# Patient Record
Sex: MALE | Race: WHITE | NOT HISPANIC OR LATINO | URBAN - NONMETROPOLITAN AREA
[De-identification: names, ages, dates, MRNs, and addresses within clinical notes are randomized per-mention and may not be internally consistent; named-entity substitution may affect disease eponyms.]

---

## 2023-06-12 ENCOUNTER — TELEMEDICINE (OUTPATIENT)
Dept: PRIMARY CARE | Facility: CLINIC | Age: 63
End: 2023-06-12
Payer: COMMERCIAL

## 2023-06-12 DIAGNOSIS — L08.9 SKIN INFECTION: Primary | ICD-10-CM

## 2023-06-12 PROCEDURE — 99213 OFFICE O/P EST LOW 20 MIN: CPT | Performed by: FAMILY MEDICINE

## 2023-06-12 RX ORDER — DOXYCYCLINE 100 MG/1
100 CAPSULE ORAL 2 TIMES DAILY
Qty: 14 CAPSULE | Refills: 0 | Status: SHIPPED | OUTPATIENT
Start: 2023-06-12 | End: 2023-06-19

## 2023-06-12 RX ORDER — SULFAMETHOXAZOLE AND TRIMETHOPRIM 800; 160 MG/1; MG/1
1 TABLET ORAL 2 TIMES DAILY
Qty: 14 TABLET | Refills: 0 | Status: SHIPPED | OUTPATIENT
Start: 2023-06-12 | End: 2023-06-19

## 2023-06-17 NOTE — PROGRESS NOTES
Subjective     Magno Marie is a 62 y.o. male who presents for No chief complaint on file..       This was completed via telephone due to the restrictions of the COVID-19 pandemic.  All issues as below were discussed and addressed but no physical exam was performed.  If it was felt that the patient should be evaluated in clinic then they were director there.  The patient/parent verbally consented to the visit.  Virtual visit Doxy.  Me video call    HPI    Complaining red tender area on the lower back causing pain and discomfort and redness.  Does not wish to go to urgent care or emergency room.  Denies abscess.                Review of Systems  Dictated as above  Objective   Virtual visit    Physical Exam  Virtual visit  Assessment/Plan     Skin infection.  Advised on warm compresses.  Started on medication.  Explained adverse effects of medications.  Advised to call 911 or to go to the emergency room as soon as possible if the redness is growing, pain and pain is getting worse, fever, chills, nausea and vomiting.  He understood verbalized understood and agreed              Problem List Items Addressed This Visit    None  Visit Diagnoses       Skin infection    -  Primary    Relevant Medications    sulfamethoxazole-trimethoprim (Bactrim DS) 800-160 mg tablet    doxycycline (Vibramycin) 100 mg capsule

## 2023-06-19 ENCOUNTER — TELEPHONE (OUTPATIENT)
Dept: PRIMARY CARE | Facility: CLINIC | Age: 63
End: 2023-06-19
Payer: COMMERCIAL

## 2023-06-19 NOTE — TELEPHONE ENCOUNTER
Received call from the patient and skin infection/abscess not improving with oral antibiotics.Advised him to go to [facility] ASAP.Patient went to the [facility] .

## 2023-06-27 ENCOUNTER — HOSPITAL ENCOUNTER (OUTPATIENT)
Dept: DATA CONVERSION | Facility: HOSPITAL | Age: 63
End: 2023-06-27
Attending: SURGERY | Admitting: SURGERY
Payer: COMMERCIAL

## 2023-06-27 DIAGNOSIS — I10 ESSENTIAL (PRIMARY) HYPERTENSION: ICD-10-CM

## 2023-06-27 DIAGNOSIS — S31.000A UNSPECIFIED OPEN WOUND OF LOWER BACK AND PELVIS WITHOUT PENETRATION INTO RETROPERITONEUM, INITIAL ENCOUNTER: ICD-10-CM

## 2023-06-27 DIAGNOSIS — E78.5 HYPERLIPIDEMIA, UNSPECIFIED: ICD-10-CM

## 2023-06-27 DIAGNOSIS — Z87.891 PERSONAL HISTORY OF NICOTINE DEPENDENCE: ICD-10-CM

## 2023-06-27 DIAGNOSIS — D46.9 MYELODYSPLASTIC SYNDROME, UNSPECIFIED (MULTI): ICD-10-CM

## 2023-06-27 DIAGNOSIS — K21.9 GASTRO-ESOPHAGEAL REFLUX DISEASE WITHOUT ESOPHAGITIS: ICD-10-CM

## 2023-06-27 DIAGNOSIS — L02.212 CUTANEOUS ABSCESS OF BACK (ANY PART, EXCEPT BUTTOCK): ICD-10-CM

## 2023-06-29 LAB
GRAM STAIN: NORMAL
TISSUE/WOUND CULTURE/SMEAR: NORMAL

## 2023-09-12 ENCOUNTER — OFFICE VISIT (OUTPATIENT)
Dept: PRIMARY CARE | Facility: CLINIC | Age: 63
End: 2023-09-12
Payer: COMMERCIAL

## 2023-09-12 VITALS
HEIGHT: 71 IN | OXYGEN SATURATION: 98 % | WEIGHT: 188.8 LBS | RESPIRATION RATE: 18 BRPM | HEART RATE: 60 BPM | BODY MASS INDEX: 26.43 KG/M2 | SYSTOLIC BLOOD PRESSURE: 135 MMHG | DIASTOLIC BLOOD PRESSURE: 86 MMHG

## 2023-09-12 DIAGNOSIS — Z00.00 HEALTHCARE MAINTENANCE: ICD-10-CM

## 2023-09-12 DIAGNOSIS — E78.5 DYSLIPIDEMIA: ICD-10-CM

## 2023-09-12 DIAGNOSIS — Z12.5 SCREENING PSA (PROSTATE SPECIFIC ANTIGEN): ICD-10-CM

## 2023-09-12 DIAGNOSIS — I10 BENIGN ESSENTIAL HTN: ICD-10-CM

## 2023-09-12 DIAGNOSIS — D46.9 MYELODYSPLASTIC SYNDROME (MULTI): ICD-10-CM

## 2023-09-12 DIAGNOSIS — Z13.220 LIPID SCREENING: ICD-10-CM

## 2023-09-12 PROBLEM — H35.30 MACULAR DEGENERATION: Status: ACTIVE | Noted: 2023-09-12

## 2023-09-12 PROBLEM — D69.6 THROMBOCYTOPENIA (CMS-HCC): Status: RESOLVED | Noted: 2018-06-27 | Resolved: 2023-09-12

## 2023-09-12 PROBLEM — R07.9 CHEST PAIN: Status: RESOLVED | Noted: 2023-09-12 | Resolved: 2023-09-12

## 2023-09-12 PROBLEM — R74.8 ABNORMAL LIVER ENZYMES: Status: ACTIVE | Noted: 2018-06-27

## 2023-09-12 PROBLEM — B36.9 FUNGAL INFECTION OF SKIN: Status: RESOLVED | Noted: 2023-09-12 | Resolved: 2023-09-12

## 2023-09-12 PROBLEM — M25.561 KNEE PAIN, RIGHT: Status: RESOLVED | Noted: 2023-09-12 | Resolved: 2023-09-12

## 2023-09-12 PROBLEM — H35.3132 NONEXUDATIVE AGE-RELATED MACULAR DEGENERATION, BILATERAL, INTERMEDIATE DRY STAGE: Status: ACTIVE | Noted: 2018-04-25

## 2023-09-12 PROBLEM — R41.3 MEMORY PROBLEM: Status: ACTIVE | Noted: 2023-09-12

## 2023-09-12 PROBLEM — R91.8 LUNG INFILTRATE: Status: RESOLVED | Noted: 2023-09-12 | Resolved: 2023-09-12

## 2023-09-12 PROBLEM — L72.3 SEBACEOUS CYST: Status: RESOLVED | Noted: 2023-09-12 | Resolved: 2023-09-12

## 2023-09-12 PROBLEM — L02.212 BACK ABSCESS: Status: RESOLVED | Noted: 2023-09-12 | Resolved: 2023-09-12

## 2023-09-12 PROBLEM — R03.0 ELEVATED BP WITHOUT DIAGNOSIS OF HYPERTENSION: Status: ACTIVE | Noted: 2017-08-25

## 2023-09-12 PROBLEM — H25.13 CATARACT, NUCLEAR SCLEROTIC SENILE, BILATERAL: Status: ACTIVE | Noted: 2018-04-25

## 2023-09-12 PROBLEM — S21.209A OPEN WOUND OF BACK: Status: RESOLVED | Noted: 2023-09-12 | Resolved: 2023-09-12

## 2023-09-12 PROBLEM — R19.4 ALTERED BOWEL HABITS: Status: ACTIVE | Noted: 2023-09-12

## 2023-09-12 PROBLEM — R89.9 ABNORMAL LABORATORY TEST: Status: ACTIVE | Noted: 2019-02-21

## 2023-09-12 PROBLEM — E66.3 OVER WEIGHT: Status: RESOLVED | Noted: 2017-05-11 | Resolved: 2023-09-12

## 2023-09-12 PROBLEM — U07.1 COVID-19 VIRUS INFECTION: Status: RESOLVED | Noted: 2023-09-12 | Resolved: 2023-09-12

## 2023-09-12 PROBLEM — H93.19 TINNITUS: Status: ACTIVE | Noted: 2023-09-12

## 2023-09-12 PROCEDURE — 3075F SYST BP GE 130 - 139MM HG: CPT | Performed by: INTERNAL MEDICINE

## 2023-09-12 PROCEDURE — 99204 OFFICE O/P NEW MOD 45 MIN: CPT | Performed by: INTERNAL MEDICINE

## 2023-09-12 PROCEDURE — 3079F DIAST BP 80-89 MM HG: CPT | Performed by: INTERNAL MEDICINE

## 2023-09-12 PROCEDURE — 1036F TOBACCO NON-USER: CPT | Performed by: INTERNAL MEDICINE

## 2023-09-12 RX ORDER — ACETAMINOPHEN 500 MG
2000 TABLET ORAL
COMMUNITY

## 2023-09-12 RX ORDER — ACETAMINOPHEN 160 MG/5ML
200 SUSPENSION, ORAL (FINAL DOSE FORM) ORAL
COMMUNITY

## 2023-09-12 RX ORDER — ROSUVASTATIN CALCIUM 20 MG/1
20 TABLET, COATED ORAL DAILY
Qty: 90 TABLET | Refills: 1 | Status: SHIPPED | OUTPATIENT
Start: 2023-09-12 | End: 2024-02-12

## 2023-09-12 RX ORDER — TRIAMCINOLONE ACETONIDE 55 UG/1
2 SPRAY, METERED NASAL AS NEEDED
COMMUNITY

## 2023-09-12 RX ORDER — AMOXICILLIN 500 MG/1
500 CAPSULE ORAL AS NEEDED
COMMUNITY

## 2023-09-12 RX ORDER — LOSARTAN POTASSIUM 25 MG/1
25 TABLET ORAL DAILY
COMMUNITY
End: 2023-09-12 | Stop reason: SDUPTHER

## 2023-09-12 RX ORDER — AZITHROMYCIN 250 MG/1
TABLET, FILM COATED ORAL AS NEEDED
COMMUNITY
Start: 2022-09-23

## 2023-09-12 RX ORDER — ROSUVASTATIN CALCIUM 20 MG/1
20 TABLET, COATED ORAL DAILY
COMMUNITY
Start: 2022-12-30 | End: 2023-09-12 | Stop reason: SDUPTHER

## 2023-09-12 RX ORDER — LOSARTAN POTASSIUM 25 MG/1
25 TABLET ORAL DAILY
Qty: 90 TABLET | Refills: 1 | Status: SHIPPED | OUTPATIENT
Start: 2023-09-12 | End: 2024-02-12

## 2023-09-12 RX ORDER — FAMOTIDINE 20 MG/1
20 TABLET, FILM COATED ORAL
COMMUNITY

## 2023-09-12 ASSESSMENT — PAIN SCALES - GENERAL: PAINLEVEL: 0-NO PAIN

## 2023-09-12 ASSESSMENT — ENCOUNTER SYMPTOMS: SHORTNESS OF BREATH: 1

## 2023-09-12 ASSESSMENT — PATIENT HEALTH QUESTIONNAIRE - PHQ9
1. LITTLE INTEREST OR PLEASURE IN DOING THINGS: NOT AT ALL
2. FEELING DOWN, DEPRESSED OR HOPELESS: NOT AT ALL
SUM OF ALL RESPONSES TO PHQ9 QUESTIONS 1 AND 2: 0

## 2023-09-12 NOTE — PROGRESS NOTES
Patient ID:   Magno Marie is a 63 y.o. male with PMH remarkable for HTN, GERD, myelodysplastic syndrome (followed by Dr Bernard, [facility]), macular degeneration, vitamin D deficiency who presents to the office today for Establish Care.    HEALTH MAINTENANCE: Establish Care  Previous PCP: Dr Vikas MD  Smoking: Former Smoker, quit in   Labs: 2023  PSA: 1.49 on 2022  Colonoscopy (45-75): 2022 with Dr Sol  Lung cancer screening (55-80 + 30 pack year + smoking/quit in last 15 years): 2022  Echo 2022 showed LVSF 55-60%, impaired relaxation  - had sebaceous cyst lanced on 2023- took atb for this  - on neutropenic precautions  - advised that he should wear a mask when out in public    SOCIAL HISTORY:  Social History     Tobacco Use    Smoking status: Former     Packs/day: 1.00     Years: 5.00     Additional pack years: 0.00     Total pack years: 5.00     Types: Cigarettes     Start date: 1977     Quit date: 1982     Years since quittin.7     Passive exposure: Never    Smokeless tobacco: Never   Substance Use Topics    Alcohol use: Not Currently     Comment: Once a month or less    Drug use: Never     IMMUNIZATIONS:  Immunization History   Administered Date(s) Administered    DTaP vaccine, pediatric  (INFANRIX) 2017    Flu vaccine (IIV4), preservative free *Check age/dose* 2020, 2021    Hepatitis A vaccine, age 19 years and greater (HAVRIX) 2010, 12/10/2010    Hepatitis B vaccine, adult (RECOMBIVAX, ENGERIX) 2010, 2010, 12/10/2010    Influenza, injectable, MDCK, preservative free, quadrivalent 2022    Moderna COVID-19 vaccine, bivalent, blue cap/gray label *Check age/dose* 2022    Moderna SARS-CoV-2 Vaccination 2021, 2021, 2021    Td vaccine, age 7 years and older (TENIVAC) 2007    Zoster vaccine, recombinant, adult (SHINGRIX) 2018, 02/15/2019     REVIEW OF SYSTEMS:  Review of Systems    Respiratory:  Positive for shortness of breath.    All other systems reviewed and are negative.    ALLERGIES:  No Known Allergies     VITAL SIGNS:  Vitals:    09/12/23 1531   BP: 135/86   Pulse: 60   Resp: 18   SpO2: 98%       Physical Exam  Vitals reviewed.   Constitutional:       General: He is not in acute distress.     Appearance: Normal appearance. He is not ill-appearing.   HENT:      Head: Normocephalic and atraumatic.      Right Ear: Tympanic membrane and external ear normal.      Left Ear: Tympanic membrane and external ear normal.      Nose: Nose normal.      Mouth/Throat:      Mouth: Mucous membranes are moist.      Pharynx: Oropharynx is clear.   Eyes:      Conjunctiva/sclera: Conjunctivae normal.      Pupils: Pupils are equal, round, and reactive to light.   Cardiovascular:      Rate and Rhythm: Normal rate and regular rhythm.      Heart sounds: Normal heart sounds. No murmur heard.  Pulmonary:      Effort: Pulmonary effort is normal. No respiratory distress.      Breath sounds: Normal breath sounds. No wheezing.   Abdominal:      General: There is no distension.      Palpations: Abdomen is soft. There is no mass.      Tenderness: There is no abdominal tenderness.   Musculoskeletal:         General: Normal range of motion.      Cervical back: Normal range of motion and neck supple.   Skin:     General: Skin is warm and dry.      Comments: I&d area on back is healed   Neurological:      General: No focal deficit present.      Mental Status: He is alert and oriented to person, place, and time.      Sensory: No sensory deficit.      Motor: No weakness.      Coordination: Coordination normal.      Gait: Gait normal.   Psychiatric:         Mood and Affect: Mood normal.         Behavior: Behavior normal.       MEDICATIONS:  Current Outpatient Medications on File Prior to Visit   Medication Sig Dispense Refill    amoxicillin (Amoxil) 500 mg capsule Take 1 capsule (500 mg) by mouth if needed (prior to dental  procedure).      azithromycin (Zithromax) 250 mg tablet if needed.      cholecalciferol (Vitamin D3) 50 mcg (2,000 unit) capsule Take 2,000 Int'l Units by mouth once daily.      coenzyme Q-10 200 mg capsule Take 1 capsule (200 mg) by mouth once daily.      ELDERBERRY FRUIT ORAL Take 1 tablet by mouth once daily.      famotidine (Pepcid) 20 mg tablet Take 1 tablet (20 mg) by mouth once daily.      mv-min/FA/vit K/lutein/zeaxant (PRESERVISION AREDS 2 PLUS MV ORAL) Take by mouth.      NON FORMULARY Take by mouth once daily. Lipoflavonoid      triamcinolone (Nasacort) 55 mcg nasal inhaler Administer 2 sprays into affected nostril(s) if needed.      [DISCONTINUED] losartan (Cozaar) 25 mg tablet Take 1 tablet (25 mg) by mouth once daily.      [DISCONTINUED] rosuvastatin (Crestor) 20 mg tablet Take 1 tablet (20 mg) by mouth once daily.       No current facility-administered medications on file prior to visit.      LABORATORY DATA:  Lab Results   Component Value Date    WBC 1.3 (L) 06/19/2023    HGB 12.6 (L) 06/19/2023    HCT 40.6 (L) 06/19/2023     (L) 06/19/2023    CHOL 121 08/20/2022    TRIG 93 08/20/2022    HDL 45.0 08/20/2022    ALT 27 06/19/2023    AST 20 06/19/2023     06/19/2023    K 3.8 06/19/2023     06/19/2023    CREATININE 1.15 06/19/2023    BUN 18 06/19/2023    CO2 24 06/19/2023    TSH 2.34 08/20/2022    PSA 1.3 11/12/2021    INR 1.1 06/13/2022     ASSESSMENT AND PLAN:  Assessment/Plan   Diagnoses and all orders for this visit:  Healthcare maintenance  -     Lipid panel; Future  -     Tsh With Reflex To Free T4 If Abnormal; Future  -     Vitamin D 25-Hydroxy,Total (for eval of Vitamin D levels); Future  -     Vitamin B12; Future  Myelodysplastic syndrome (CMS/HCC)  -     Tsh With Reflex To Free T4 If Abnormal; Future  -     Vitamin D 25-Hydroxy,Total (for eval of Vitamin D levels); Future  -     Vitamin B12; Future  Benign essential HTN  -     losartan (Cozaar) 25 mg tablet; Take 1 tablet (25  mg) by mouth once daily.  Dyslipidemia  -     rosuvastatin (Crestor) 20 mg tablet; Take 1 tablet (20 mg) by mouth once daily.  Lipid screening  -     Lipid panel; Future  Screening PSA (prostate specific antigen)  -     Prostate Spec.Ag,Screen; Future      --------------------  Written by Eboni Caal RN, acting as a scribe for Dr. Casper. This note accurately reflects the work and decisions made by Dr. Casper.     I, Dr. Casper, attest all medical record entries made by the scribe were under my direction and were personally dictated by me. I have reviewed the chart and agree that the record accurately reflects my performance of the history, physical exam, and assessment and plan.

## 2023-09-29 VITALS — WEIGHT: 191.8 LBS | BODY MASS INDEX: 22.19 KG/M2 | HEIGHT: 78 IN

## 2023-09-30 NOTE — H&P
History & Physical Reviewed:   I have reviewed the History and Physical dated:  23-Jun-2023   History and Physical reviewed and relevant findings noted. Patient examined to review pertinent physical  findings.: No significant changes   Home Medications Reviewed: no changes noted   Allergies Reviewed: no changes noted       ERAS (Enhanced Recovery After Surgery):  ·  ERAS Patient: no     Consent:   COVID-19 Consent:  ·  COVID-19 Risk Consent Surgeon has reviewed key risks related to the risk of carlos COVID-19 and if they contract COVID-19 what the risks are.       Electronic Signatures:  Tito Sol)  (Signed 27-Jun-2023 10:52)   Authored: History & Physical Reviewed, ERAS, Consent,  Note Completion      Last Updated: 27-Jun-2023 10:52 by Tito Sol)

## 2023-10-02 NOTE — OP NOTE
Post Operative Note:     PreOp Diagnosis: Lower back abscess, open back wound,  myelodysplastic syndrome   Post-Procedure Diagnosis: Same   Procedure: 1.  Debridement of lower back abscess  measuring 3 x 3 x 1 cm  2.   3.   4.   5.   Surgeon: Sweta   Resident/Fellow/Other Assistant: Bhavik   Anesthesia: MAC   Estimated Blood Loss (mL): none   Specimen: yes. Cultures were sent   Complications: None   Findings: See note   Additional Details: Wound class IV  Wound packed with Exufiber silver Mepilex border dressing.     Operative Report Dictated:  Dictation: not applicable - note contains Operative  Report   Operative Report:    Indications-this is a 59-year-old male with myelodysplastic syndrome who presented with an open wound of the lower back.  This been incised in the emergency room  and he now presented for definitive treatment of this as this continues to have purulent drainage.  Initial cultures confirmed staph coccus.    Findings-the patient had a 3 x 3 x 1 cm wound of the lower back that had necrotic tissue present within this.  All the nonviable tissue was removed with the scissors and this was carried down to fresh bleeding tissue.  This was a sharp excisional debridement.   The depth of debridement measured 1 cm.      Procedure-after obtaining informed consent the patient discussing all the risk which include but not limited pain, infection, bleeding, cardiac, pulmonary, neurologic, locomotor, anesthetic events or laparoscopic including death the patient was placed  in the left lateral decubitus position.  The back was prepped and draped in aseptic fashion.  The wound was examined.  There was nonviable tissue present within the depth of the wound.  This was sharply removed with a scissors curettes were used to excise  all nonviable tissue and this was carried down to fresh bleeding tissue.  This was a sharp excisional debridement.  There was complete hemostasis of the wound with cautery.  Dressings were  placed as above.  Patient proceed on discharge to the recovery  in stable condition.      Electronic Signatures:  Tito Sol)  (Signed 27-Jun-2023 11:50)   Authored: Post Operative Note, Note Completion      Last Updated: 27-Jun-2023 11:50 by Tito Sol (MD)

## 2024-02-09 DIAGNOSIS — E78.5 DYSLIPIDEMIA: ICD-10-CM

## 2024-02-09 DIAGNOSIS — I10 BENIGN ESSENTIAL HTN: ICD-10-CM

## 2024-02-12 RX ORDER — ROSUVASTATIN CALCIUM 20 MG/1
20 TABLET, COATED ORAL DAILY
Qty: 90 TABLET | Refills: 1 | Status: SHIPPED | OUTPATIENT
Start: 2024-02-12

## 2024-02-12 RX ORDER — LOSARTAN POTASSIUM 25 MG/1
25 TABLET ORAL DAILY
Qty: 90 TABLET | Refills: 1 | Status: SHIPPED | OUTPATIENT
Start: 2024-02-12

## 2024-07-16 ENCOUNTER — PATIENT OUTREACH (OUTPATIENT)
Dept: PRIMARY CARE | Facility: CLINIC | Age: 64
End: 2024-07-16
Payer: COMMERCIAL

## 2024-07-16 RX ORDER — INDOMETHACIN 25 MG/1
25 CAPSULE ORAL
COMMUNITY

## 2024-07-16 NOTE — PROGRESS NOTES
Discharge Facility: [facility]  Discharge Diagnosis: Non-STEMI  Admission Date: 13 July 24  Discharge Date: 15 July 24    PCP Appointment Date: 29 July 24 (Set by me)  Specialist Appointment Date: 26 July 24 ([facility] Marco Antonio MCDOWELL @ [facility])  Hospital Encounter and Summary Linked: Yes    See discharge assessment below for further details     Engagement  Call Start Time: 1036 (7/16/2024 10:46 AM)    Medications  Medications reviewed with patient/caregiver?: Yes (7/16/2024 10:46 AM)  Is the patient having any side effects they believe may be caused by any medication additions or changes?: No (7/16/2024 10:46 AM)  Does the patient have all medications ordered at discharge?: Yes (7/16/2024 10:46 AM)  Prescription Comments: None (7/16/2024 10:46 AM)  Is the patient taking all medications as directed (includes completed medication regime)?: Yes (7/16/2024 10:46 AM)  Medication Comments: None (7/16/2024 10:46 AM)    Appointments  Does the patient have a primary care provider?: Yes (follow up set by me for 29 July 24) (7/16/2024 10:46 AM)  Care Management Interventions: Verified appointment date/time/provider (7/16/2024 10:46 AM)  Has the patient kept scheduled appointments due by today?: Yes (7/16/2024 10:46 AM)  Care Management Interventions: Advised patient to keep appointment (7/16/2024 10:46 AM)    Self Management  What is the home health agency?: N/A (7/16/2024 10:46 AM)  Has home health visited the patient within 72 hours of discharge?: Not applicable (7/16/2024 10:46 AM)  What Durable Medical Equipment (DME) was ordered?: N/A (7/16/2024 10:46 AM)    Patient Teaching  Does the patient have access to their discharge instructions?: Yes (7/16/2024 10:46 AM)  Care Management Interventions: Reviewed instructions with patient (7/16/2024 10:46 AM)  What is the patient's perception of their health status since discharge?: Improving (7/16/2024 10:46 AM)  Is the patient/caregiver able to teach back the hierarchy of who to  call/visit for symptoms/problems? PCP, Specialist, Home Health nurse, Urgent Care, ED, 911: Yes (7/16/2024 10:46 AM)  Patient/Caregiver Education Comments: None (7/16/2024 10:46 AM)    Wrap Up  Wrap Up Additional Comments: None (7/16/2024 10:46 AM)  Call End Time: 1046 (7/16/2024 10:46 AM)    Pt grateful for outreach call and is agreeable to being contacted after PCP appt to see how they are doing.

## 2024-07-29 ENCOUNTER — APPOINTMENT (OUTPATIENT)
Dept: PRIMARY CARE | Facility: CLINIC | Age: 64
End: 2024-07-29
Payer: MEDICARE

## 2024-07-29 VITALS
HEIGHT: 71 IN | RESPIRATION RATE: 16 BRPM | HEART RATE: 86 BPM | BODY MASS INDEX: 25.2 KG/M2 | SYSTOLIC BLOOD PRESSURE: 140 MMHG | OXYGEN SATURATION: 94 % | WEIGHT: 180 LBS | DIASTOLIC BLOOD PRESSURE: 86 MMHG

## 2024-07-29 DIAGNOSIS — K59.00 CONSTIPATION, UNSPECIFIED CONSTIPATION TYPE: ICD-10-CM

## 2024-07-29 DIAGNOSIS — I10 BENIGN ESSENTIAL HTN: ICD-10-CM

## 2024-07-29 DIAGNOSIS — Z09 HOSPITAL DISCHARGE FOLLOW-UP: ICD-10-CM

## 2024-07-29 DIAGNOSIS — I30.9 ACUTE PERICARDITIS, UNSPECIFIED TYPE (HHS-HCC): ICD-10-CM

## 2024-07-29 DIAGNOSIS — E78.5 DYSLIPIDEMIA: ICD-10-CM

## 2024-07-29 PROCEDURE — 99495 TRANSJ CARE MGMT MOD F2F 14D: CPT | Performed by: INTERNAL MEDICINE

## 2024-07-29 PROCEDURE — 1159F MED LIST DOCD IN RCRD: CPT | Performed by: INTERNAL MEDICINE

## 2024-07-29 PROCEDURE — 1158F ADVNC CARE PLAN TLK DOCD: CPT | Performed by: INTERNAL MEDICINE

## 2024-07-29 PROCEDURE — 1036F TOBACCO NON-USER: CPT | Performed by: INTERNAL MEDICINE

## 2024-07-29 PROCEDURE — 1125F AMNT PAIN NOTED PAIN PRSNT: CPT | Performed by: INTERNAL MEDICINE

## 2024-07-29 PROCEDURE — 1123F ACP DISCUSS/DSCN MKR DOCD: CPT | Performed by: INTERNAL MEDICINE

## 2024-07-29 PROCEDURE — 3079F DIAST BP 80-89 MM HG: CPT | Performed by: INTERNAL MEDICINE

## 2024-07-29 PROCEDURE — 3008F BODY MASS INDEX DOCD: CPT | Performed by: INTERNAL MEDICINE

## 2024-07-29 PROCEDURE — 1160F RVW MEDS BY RX/DR IN RCRD: CPT | Performed by: INTERNAL MEDICINE

## 2024-07-29 PROCEDURE — 3077F SYST BP >= 140 MM HG: CPT | Performed by: INTERNAL MEDICINE

## 2024-07-29 RX ORDER — SENNOSIDES 8.6 MG/1
1 TABLET ORAL DAILY
Qty: 90 TABLET | Refills: 2 | Status: SHIPPED | OUTPATIENT
Start: 2024-07-29 | End: 2025-04-25

## 2024-07-29 RX ORDER — LOSARTAN POTASSIUM 25 MG/1
25 TABLET ORAL DAILY
Qty: 90 TABLET | Refills: 1 | Status: SHIPPED | OUTPATIENT
Start: 2024-07-29

## 2024-07-29 RX ORDER — ROSUVASTATIN CALCIUM 20 MG/1
20 TABLET, COATED ORAL DAILY
Qty: 90 TABLET | Refills: 1 | Status: SHIPPED | OUTPATIENT
Start: 2024-07-29

## 2024-07-29 RX ORDER — LACTULOSE 10 G/15ML
20 SOLUTION ORAL; RECTAL DAILY
Qty: 473 ML | Refills: 3 | Status: SHIPPED | OUTPATIENT
Start: 2024-07-29

## 2024-07-29 RX ORDER — DOCUSATE SODIUM 100 MG/1
100 CAPSULE, LIQUID FILLED ORAL 2 TIMES DAILY
Qty: 60 CAPSULE | Refills: 3 | Status: SHIPPED | OUTPATIENT
Start: 2024-07-29

## 2024-07-29 ASSESSMENT — ENCOUNTER SYMPTOMS
CONSTITUTIONAL NEGATIVE: 1
CONSTIPATION: 1
PSYCHIATRIC NEGATIVE: 1
RESPIRATORY NEGATIVE: 1
MUSCULOSKELETAL NEGATIVE: 1
CARDIOVASCULAR NEGATIVE: 1
NEUROLOGICAL NEGATIVE: 1

## 2024-07-29 ASSESSMENT — PATIENT HEALTH QUESTIONNAIRE - PHQ9
SUM OF ALL RESPONSES TO PHQ9 QUESTIONS 1 AND 2: 0
1. LITTLE INTEREST OR PLEASURE IN DOING THINGS: NOT AT ALL
2. FEELING DOWN, DEPRESSED OR HOPELESS: NOT AT ALL

## 2024-07-29 ASSESSMENT — PAIN SCALES - GENERAL: PAINLEVEL: 2

## 2024-07-29 ASSESSMENT — COLUMBIA-SUICIDE SEVERITY RATING SCALE - C-SSRS
1. IN THE PAST MONTH, HAVE YOU WISHED YOU WERE DEAD OR WISHED YOU COULD GO TO SLEEP AND NOT WAKE UP?: NO
6. HAVE YOU EVER DONE ANYTHING, STARTED TO DO ANYTHING, OR PREPARED TO DO ANYTHING TO END YOUR LIFE?: NO
2. HAVE YOU ACTUALLY HAD ANY THOUGHTS OF KILLING YOURSELF?: NO

## 2024-07-29 NOTE — PROGRESS NOTES
"Patient ID: He states that on 7/9/24, he was in a very hot attic for several hours, and was stressed. He states he took a break in air conditioned van. He states that he then did not feel well next couple days. He states he went to Urgent care, was negative for RSV, COVID and flu. He states he woke up on 7/13 with chest pain across his chest, went to ER. He states he took Indocin after discharge from hospital, but stopped it last week, due to constipation. He states he went to ER on 07/20 for constipation(no BM for 9 days). He states he was given laxative and had BM. He states he has not had bm since then again. He states that he did not have constipation prior to being in the heat in the attic just prior to hospitalization this month. He states that his eating was off the week he was in the attic due to temperatures he was working in.  He states that he has not taken anything to make his bowels move, will take laxative when his schedule is free .    Hospitalization Discharge Follow Up:  Date(s): 07/13-->07/15/24   Location: [facility]  Reason Presented to ER: chest pain  Synopsis: NSTEMI  Recommended FU: cardiology and PCP  Stress test on 7/15/24 revealed: 1. SPECT Perfusion Study: Normal. 2. There is no scintigraphic evidence for inducible ischemia. 3. No evidence of scarred myocardium. 4. Left ventricle is normal in size. The left ventricle systolic function is normal. 5. This is a low risk scan.     Per discharge summary:   \"Hospital Course:   This is a 64-year-old female with PMH of MDS, HTN, HDL, GERD who presented emergency room with complaint of chest pain. He stated that he went to the urgent care center because he was feeling congested, was COVID-negative. Chest pain got worse, more with breathing so he decided to come to the emergency room. In the ED, D-dimer 3.34, CTA chest ruled out PE showed intralobular septal thickening, no consolidation. Chest x-ray: No acute cardiopulmonary process, proBNP 4970. Initial " Report given to Janay Ramirez RN from ER. Report method in person   The following was reviewed with receiving RN:   Current vital signs:  BP (!) 111/46   Pulse 79   Temp 97.1 °F (36.2 °C) (Oral)   Resp 17   Ht 4' 11\" (1.499 m)   Wt 134 lb (60.8 kg)   SpO2 96%   BMI 27.06 kg/m²                MEWS Score: 1     Any medication or safety alerts were reviewed. Any pending diagnostics and notifications were also reviewed, as well as any safety concerns or issues, abnormal labs, abnormal imaging, and abnormal assessment findings. Questions were answered.             Ramila Polk RN  03/01/22 6272 "troponin 1, repeat trop: 1>> 0.5 > 0.4. Lipid panel: Cholesterol 101, LDL 60. He was started on a heparin drip, aspirin, statin. Seen by cardiology, recommended to DC heparin drip elevated troponin with chest pain likely due to pericarditis. Hospital course was uneventful, he denies complaint of pain since admission. Nuclear stress test shows low risk, echo: EF 59±5%, grade 1 diastolic dysfunction. , CRP 20. He was being evaluated by cardiology recommended Indocin 25 mg twice daily for 2 weeks to follow-up as outpatient and neck in the clinic for possible cardiac MRI     Final Diagnoses:   # Pleuritic chest pain, likely secondary to pericarditis  # Elevated troponin likely secondary to above \"    BRENNAN Marie is a 64 y.o. male with PMH remarkable for HTN, GERD, myelodysplastic syndrome (followed by Dr Bernard, [facility]), macular degeneration, vitamin D deficiency  who presents to the office today for Post Hospitalization.    Social History     Tobacco Use    Smoking status: Former     Current packs/day: 0.00     Average packs/day: 1 pack/day for 5.0 years (5.0 ttl pk-yrs)     Types: Cigarettes     Start date: 1977     Quit date: 1982     Years since quittin.6     Passive exposure: Never    Smokeless tobacco: Never   Substance Use Topics    Alcohol use: Not Currently     Comment: Once a month or less    Drug use: Never       Immunization History   Administered Date(s) Administered    DTaP vaccine, pediatric  (INFANRIX) 2017    Flu vaccine (IIV4), preservative free *Check age/dose* 2020, 2021    Flu vaccine, quadrivalent, no egg protein, age 6 month or greater (FLUCELVAX) 2022    Hepatitis A vaccine, age 19 years and greater (HAVRIX) 2010, 12/10/2010    Hepatitis B vaccine, adult *Check Product/Dose* 2010, 2010, 12/10/2010    Moderna COVID-19 vaccine, bivalent, blue cap/gray label *Check age/dose* 2022    Td vaccine, age 7 years and older " "(TENIVAC) 01/30/2007    Zoster vaccine, recombinant, adult (SHINGRIX) 07/02/2018, 02/15/2019       Review of Systems   Constitutional: Negative.    HENT: Negative.     Respiratory: Negative.     Cardiovascular: Negative.    Gastrointestinal:  Positive for constipation.   Genitourinary: Negative.    Musculoskeletal: Negative.    Skin: Negative.    Neurological: Negative.    Psychiatric/Behavioral: Negative.         Visit Vitals  /86 (BP Location: Right arm)   Pulse 86   Resp 16   Ht 1.803 m (5' 11\")   Wt 81.6 kg (180 lb)   SpO2 94%   BMI 25.10 kg/m²   Smoking Status Former   BSA 2.02 m²       Not on File     Physical Exam  Vitals reviewed.   Constitutional:       Appearance: Normal appearance.   HENT:      Head: Normocephalic and atraumatic.   Cardiovascular:      Rate and Rhythm: Normal rate and regular rhythm.      Heart sounds:      Friction rub present.   Pulmonary:      Effort: Pulmonary effort is normal.      Breath sounds: Normal breath sounds.   Abdominal:      General: Bowel sounds are normal.      Palpations: Abdomen is soft.   Musculoskeletal:         General: Normal range of motion.   Skin:     General: Skin is warm and dry.   Neurological:      General: No focal deficit present.      Mental Status: He is alert and oriented to person, place, and time.   Psychiatric:         Mood and Affect: Mood normal.         Behavior: Behavior normal.         Current Outpatient Medications   Medication Instructions    amoxicillin (AMOXIL) 500 mg, oral, As needed    azithromycin (Zithromax) 250 mg tablet As needed    cholecalciferol (Vitamin D3) 50 mcg (2,000 unit) capsule 2,000 Int'l Units, oral, Daily RT    coenzyme Q-10 200 mg, oral, Daily RT    ELDERBERRY FRUIT ORAL 1 tablet, oral, Daily RT    famotidine (PEPCID) 20 mg, oral, Daily RT    indomethacin (INDOCIN) 25 mg, oral, 2 times daily (morning and late afternoon), Take 1 capsule by mouth two times a day with meals for 14 days    losartan (COZAAR) 25 mg, " oral, Daily    mv-min/FA/vit K/lutein/zeaxant (PRESERVISION AREDS 2 PLUS MV ORAL) oral    NON FORMULARY oral, Daily RT, Lipoflavonoid     rosuvastatin (CRESTOR) 20 mg, oral, Daily    triamcinolone (Nasacort) 55 mcg nasal inhaler 2 sprays, nasal, As needed        Lab Results   Component Value Date    WBC 1.3 (L) 06/19/2023    HGB 12.6 (L) 06/19/2023    HCT 40.6 (L) 06/19/2023     (L) 06/19/2023    CHOL 121 08/20/2022    TRIG 93 08/20/2022    HDL 45.0 08/20/2022    ALT 27 06/19/2023    AST 20 06/19/2023     06/19/2023    K 3.8 06/19/2023     06/19/2023    CREATININE 1.15 06/19/2023    BUN 18 06/19/2023    CO2 24 06/19/2023    TSH 2.34 08/20/2022    PSA 1.3 11/12/2021    INR 1.1 06/13/2022     Problem List Items Addressed This Visit             ICD-10-CM    Constipation K59.00    Relevant Medications    docusate sodium (Colace) 100 mg capsule    sennosides (senna) 8.6 mg tablet    lactulose (Enulose) 20 gram/30 mL oral solution    Benign essential HTN I10    Relevant Medications    losartan (Cozaar) 25 mg tablet    Dyslipidemia E78.5    Relevant Medications    rosuvastatin (Crestor) 20 mg tablet    Acute pericarditis (HHS-HCC) I30.9    Hospital discharge follow-up Z09        - advised to continue with Indocin       - advised to take PPI while on Indocin, patient states he will purchase Omeprazole OTC as it is more affordable this way       - follow up with cardiology    --------------------  Written by Rosy Hernandez LPN, acting as a scribe for Dr. Casper. This note accurately reflects the work and decisions made by Dr. Casper.     I, Dr. Casper, attest all medical record entries made by the scribe were under my direction and were personally dictated by me. I have reviewed the chart and agree that the record accurately reflects my performance of the history, physical exam, and assessment and plan.

## 2024-07-29 NOTE — PATIENT INSTRUCTIONS
It was great to see you in the office today! Here is what we discussed at your visit today:  Please continue to take your current medications   For your constipation:   Take colace 100mg twice daily   Take Senna 8.6mg (take 2 tablets tonight, and then tomorrow start one tablet every night)  Lactulose syrup 30ml X one dose tonight and if no BM, repeat after 2-3 hours  Then take as needed if no bowel movement in 3 days  Take Omeprazole daily while on Indocin

## 2024-07-30 ENCOUNTER — PATIENT OUTREACH (OUTPATIENT)
Dept: PRIMARY CARE | Facility: CLINIC | Age: 64
End: 2024-07-30
Payer: COMMERCIAL

## 2024-07-30 NOTE — PROGRESS NOTES
Unable to reach patient for call back after patient's follow up appointment with PCP.   MARIIM with call back number for patient to call if needed   If no voicemail available call attempts x 2 were made to contact the patient to assist with any questions or concerns patient may have.

## 2024-08-04 DIAGNOSIS — E78.5 DYSLIPIDEMIA: ICD-10-CM

## 2024-08-04 DIAGNOSIS — I10 BENIGN ESSENTIAL HTN: ICD-10-CM

## 2024-08-05 RX ORDER — LOSARTAN POTASSIUM 25 MG/1
25 TABLET ORAL DAILY
Qty: 90 TABLET | Refills: 1 | Status: SHIPPED | OUTPATIENT
Start: 2024-08-05

## 2024-08-05 RX ORDER — ROSUVASTATIN CALCIUM 20 MG/1
20 TABLET, COATED ORAL DAILY
Qty: 90 TABLET | Refills: 1 | Status: SHIPPED | OUTPATIENT
Start: 2024-08-05

## 2024-08-12 ENCOUNTER — PATIENT OUTREACH (OUTPATIENT)
Dept: PRIMARY CARE | Facility: CLINIC | Age: 64
End: 2024-08-12
Payer: COMMERCIAL

## 2024-10-11 ENCOUNTER — PATIENT OUTREACH (OUTPATIENT)
Dept: PRIMARY CARE | Facility: CLINIC | Age: 64
End: 2024-10-11
Payer: MEDICARE

## 2025-02-07 DIAGNOSIS — E78.5 DYSLIPIDEMIA: ICD-10-CM

## 2025-02-07 DIAGNOSIS — I10 BENIGN ESSENTIAL HTN: ICD-10-CM

## 2025-02-10 RX ORDER — LOSARTAN POTASSIUM 25 MG/1
25 TABLET ORAL DAILY
Qty: 90 TABLET | Refills: 1 | OUTPATIENT
Start: 2025-02-10

## 2025-02-10 RX ORDER — ROSUVASTATIN CALCIUM 20 MG/1
20 TABLET, COATED ORAL DAILY
Qty: 90 TABLET | Refills: 1 | OUTPATIENT
Start: 2025-02-10

## 2025-02-19 RX ORDER — LOSARTAN POTASSIUM 25 MG/1
25 TABLET ORAL DAILY
Qty: 90 TABLET | Refills: 1 | OUTPATIENT
Start: 2025-02-19

## 2025-03-01 DIAGNOSIS — I10 BENIGN ESSENTIAL HTN: ICD-10-CM

## 2025-03-01 DIAGNOSIS — E78.5 DYSLIPIDEMIA: ICD-10-CM

## 2025-03-03 RX ORDER — LOSARTAN POTASSIUM 25 MG/1
25 TABLET ORAL DAILY
Qty: 30 TABLET | Refills: 0 | Status: SHIPPED | OUTPATIENT
Start: 2025-03-03

## 2025-03-03 RX ORDER — ROSUVASTATIN CALCIUM 20 MG/1
20 TABLET, COATED ORAL DAILY
Qty: 30 TABLET | Refills: 0 | Status: SHIPPED | OUTPATIENT
Start: 2025-03-03

## 2025-03-12 DIAGNOSIS — I10 BENIGN ESSENTIAL HTN: ICD-10-CM

## 2025-03-12 DIAGNOSIS — E78.5 DYSLIPIDEMIA: ICD-10-CM

## 2025-03-14 RX ORDER — LOSARTAN POTASSIUM 25 MG/1
25 TABLET ORAL DAILY
Qty: 30 TABLET | Refills: 0 | OUTPATIENT
Start: 2025-03-14

## 2025-03-14 RX ORDER — ROSUVASTATIN CALCIUM 20 MG/1
20 TABLET, COATED ORAL DAILY
Qty: 30 TABLET | Refills: 0 | OUTPATIENT
Start: 2025-03-14

## 2025-03-27 DIAGNOSIS — E78.5 DYSLIPIDEMIA: ICD-10-CM

## 2025-03-27 DIAGNOSIS — I10 BENIGN ESSENTIAL HTN: ICD-10-CM

## 2025-03-28 RX ORDER — LOSARTAN POTASSIUM 25 MG/1
25 TABLET ORAL DAILY
Qty: 90 TABLET | Refills: 0 | Status: SHIPPED | OUTPATIENT
Start: 2025-03-28

## 2025-03-28 RX ORDER — ROSUVASTATIN CALCIUM 20 MG/1
20 TABLET, COATED ORAL DAILY
Qty: 90 TABLET | Refills: 0 | Status: SHIPPED | OUTPATIENT
Start: 2025-03-28